# Patient Record
Sex: MALE | Race: ASIAN | NOT HISPANIC OR LATINO | URBAN - METROPOLITAN AREA
[De-identification: names, ages, dates, MRNs, and addresses within clinical notes are randomized per-mention and may not be internally consistent; named-entity substitution may affect disease eponyms.]

---

## 2018-07-08 ENCOUNTER — EMERGENCY (EMERGENCY)
Facility: HOSPITAL | Age: 38
LOS: 1 days | Discharge: HOME | End: 2018-07-08
Attending: EMERGENCY MEDICINE | Admitting: EMERGENCY MEDICINE
Payer: MEDICAID

## 2018-07-08 VITALS
RESPIRATION RATE: 16 BRPM | SYSTOLIC BLOOD PRESSURE: 139 MMHG | TEMPERATURE: 98 F | DIASTOLIC BLOOD PRESSURE: 85 MMHG | HEART RATE: 86 BPM | OXYGEN SATURATION: 100 %

## 2018-07-08 PROCEDURE — 99283 EMERGENCY DEPT VISIT LOW MDM: CPT

## 2018-07-08 RX ORDER — IBUPROFEN 200 MG
1 TABLET ORAL
Qty: 28 | Refills: 0 | OUTPATIENT
Start: 2018-07-08 | End: 2018-07-14

## 2018-07-08 RX ORDER — IBUPROFEN 200 MG
600 TABLET ORAL ONCE
Qty: 0 | Refills: 0 | Status: COMPLETED | OUTPATIENT
Start: 2018-07-08 | End: 2018-07-08

## 2018-07-08 RX ADMIN — Medication 600 MILLIGRAM(S): at 18:33

## 2018-07-08 NOTE — ED PROVIDER NOTE - MEDICAL DECISION MAKING DETAILS
38 y/o M w/ MSK lower back pain x3days. No sign for concerning infection . Pt has not taken an appropriate dose of NSAIDs prior to coming to ED Plan: Limited physical activity, Ibuprofen every 6 hrs as needed for back pain, if symptoms persist or worsen pt will need to be reevaluated, possible MRI, FU spine surgery , return precautions 38 y/o M w/ MSK lower back pain x3days. No signs or symptoms concerning for cauda equina syndrome or infection.  Pt has not taken an appropriate dose of NSAIDs prior to coming to ED Plan: Limited physical activity, Ibuprofen every 6 hrs as needed for back pain, if symptoms persist or worsen pt will need to be reevaluated, possible MRI, FU spine surgery , return precautions

## 2018-07-08 NOTE — ED ADULT TRIAGE NOTE - CHIEF COMPLAINT QUOTE
history via [facility] . c/o lower back pains x  past 2 days. 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 denies abd pains,problems   urinating

## 2018-07-08 NOTE — ED PROVIDER NOTE - NS_ ATTENDINGSCRIBEDETAILS _ED_A_ED_FT
MD Jeff:  The scribe's documentation has been prepared under my direction and personally reviewed by me in its entirety. I confirm that the note above accurately reflects all work, treatment, procedures, and medical decision making performed by me.  MD Jeff:  see the MDM & progress notes for my I&P.

## 2018-07-08 NOTE — ED PROVIDER NOTE - OBJECTIVE STATEMENT
38 y/o M w/ no pertinent PMH presents to ED c/o back pain x3days. Pt was sitting down and when he was suddenly getting up there was a sharp pain in his back. He notes to experiencing similar symptoms in the past but it went away after some exercise. 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 He is currently ambulatory in ER but is unable to stand up straight and notes to pain worsening w/ movement. He feels relief when lying down straight, or staying still. Endorses use of Tylenol for pain relief. Denies any numbness/tingling or any other complaints. NKDA. : [id number] 36 y/o M w/ no pertinent PMH presents to ED c/o back pain x3days. Pt was sitting down and when he was suddenly getting up there was a sharp pain in his back. He notes to experiencing similar symptoms in the past but it went away after some exercise. 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 He is currently ambulatory in ER but is unable to stand up straight.  Worse: movement. Better: feels relief when lying down straight, or staying still. Endorses use of Tylenol w/o significant relief of Sx.  Denies any numbness/tingling, weakness, or saddle anaesthesia.  no f/c.  NKDA. : [id number]

## 2018-07-08 NOTE — ED PROVIDER NOTE - EXTREMITY EXAM
Mild distress, walks hunched over, no midline tenderness,  No saddle anesthesia, reflexes 2+ symmetric, positive R paraspinal ttp.

## 2018-09-29 ENCOUNTER — EMERGENCY (EMERGENCY)
Facility: HOSPITAL | Age: 38
LOS: 1 days | Discharge: HOME | End: 2018-09-29
Attending: EMERGENCY MEDICINE | Admitting: EMERGENCY MEDICINE
Payer: MEDICAID

## 2018-09-29 VITALS
HEART RATE: 55 BPM | DIASTOLIC BLOOD PRESSURE: 64 MMHG | RESPIRATION RATE: 18 BRPM | SYSTOLIC BLOOD PRESSURE: 117 MMHG | OXYGEN SATURATION: 100 %

## 2018-09-29 VITALS
OXYGEN SATURATION: 100 % | TEMPERATURE: 98 F | DIASTOLIC BLOOD PRESSURE: 82 MMHG | SYSTOLIC BLOOD PRESSURE: 129 MMHG | HEART RATE: 72 BPM | RESPIRATION RATE: 16 BRPM

## 2018-09-29 LAB
ALBUMIN SERPL ELPH-MCNC: 4.6 G/DL — SIGNIFICANT CHANGE UP (ref 3.3–5)
ALP SERPL-CCNC: 95 U/L — SIGNIFICANT CHANGE UP (ref 40–120)
ALT FLD-CCNC: 42 U/L — HIGH (ref 4–41)
AST SERPL-CCNC: 27 U/L — SIGNIFICANT CHANGE UP (ref 4–40)
BASOPHILS # BLD AUTO: 0.05 K/UL — SIGNIFICANT CHANGE UP (ref 0–0.2)
BASOPHILS NFR BLD AUTO: 0.4 % — SIGNIFICANT CHANGE UP (ref 0–2)
BILIRUB SERPL-MCNC: 0.7 MG/DL — SIGNIFICANT CHANGE UP (ref 0.2–1.2)
BUN SERPL-MCNC: 10 MG/DL — SIGNIFICANT CHANGE UP (ref 7–23)
CALCIUM SERPL-MCNC: 9.8 MG/DL — SIGNIFICANT CHANGE UP (ref 8.4–10.5)
CHLORIDE SERPL-SCNC: 103 MMOL/L — SIGNIFICANT CHANGE UP (ref 98–107)
CO2 SERPL-SCNC: 24 MMOL/L — SIGNIFICANT CHANGE UP (ref 22–31)
CREAT SERPL-MCNC: 0.99 MG/DL — SIGNIFICANT CHANGE UP (ref 0.5–1.3)
EOSINOPHIL # BLD AUTO: 0.56 K/UL — HIGH (ref 0–0.5)
EOSINOPHIL NFR BLD AUTO: 4.8 % — SIGNIFICANT CHANGE UP (ref 0–6)
GLUCOSE SERPL-MCNC: 104 MG/DL — HIGH (ref 70–99)
HCT VFR BLD CALC: 45.4 % — SIGNIFICANT CHANGE UP (ref 39–50)
HGB BLD-MCNC: 14.6 G/DL — SIGNIFICANT CHANGE UP (ref 13–17)
IMM GRANULOCYTES # BLD AUTO: 0.03 # — SIGNIFICANT CHANGE UP
IMM GRANULOCYTES NFR BLD AUTO: 0.3 % — SIGNIFICANT CHANGE UP (ref 0–1.5)
LYMPHOCYTES # BLD AUTO: 26.1 % — SIGNIFICANT CHANGE UP (ref 13–44)
LYMPHOCYTES # BLD AUTO: 3.07 K/UL — SIGNIFICANT CHANGE UP (ref 1–3.3)
MCHC RBC-ENTMCNC: 28 PG — SIGNIFICANT CHANGE UP (ref 27–34)
MCHC RBC-ENTMCNC: 32.2 % — SIGNIFICANT CHANGE UP (ref 32–36)
MCV RBC AUTO: 87.1 FL — SIGNIFICANT CHANGE UP (ref 80–100)
MONOCYTES # BLD AUTO: 0.61 K/UL — SIGNIFICANT CHANGE UP (ref 0–0.9)
MONOCYTES NFR BLD AUTO: 5.2 % — SIGNIFICANT CHANGE UP (ref 2–14)
NEUTROPHILS # BLD AUTO: 7.43 K/UL — HIGH (ref 1.8–7.4)
NEUTROPHILS NFR BLD AUTO: 63.2 % — SIGNIFICANT CHANGE UP (ref 43–77)
NRBC # FLD: 0 — SIGNIFICANT CHANGE UP
PLATELET # BLD AUTO: 371 K/UL — SIGNIFICANT CHANGE UP (ref 150–400)
PMV BLD: 11 FL — SIGNIFICANT CHANGE UP (ref 7–13)
POTASSIUM SERPL-MCNC: 4.3 MMOL/L — SIGNIFICANT CHANGE UP (ref 3.5–5.3)
POTASSIUM SERPL-SCNC: 4.3 MMOL/L — SIGNIFICANT CHANGE UP (ref 3.5–5.3)
PROT SERPL-MCNC: 7.8 G/DL — SIGNIFICANT CHANGE UP (ref 6–8.3)
RBC # BLD: 5.21 M/UL — SIGNIFICANT CHANGE UP (ref 4.2–5.8)
RBC # FLD: 11.7 % — SIGNIFICANT CHANGE UP (ref 10.3–14.5)
SODIUM SERPL-SCNC: 141 MMOL/L — SIGNIFICANT CHANGE UP (ref 135–145)
TROPONIN T, HIGH SENSITIVITY: < 6 NG/L — SIGNIFICANT CHANGE UP (ref ?–14)
WBC # BLD: 11.75 K/UL — HIGH (ref 3.8–10.5)
WBC # FLD AUTO: 11.75 K/UL — HIGH (ref 3.8–10.5)

## 2018-09-29 PROCEDURE — 99284 EMERGENCY DEPT VISIT MOD MDM: CPT

## 2018-09-29 NOTE — ED PROVIDER NOTE - OBJECTIVE STATEMENT
37 yr old M c h/o HTN who p/w left sided chest pain radiating to left arm intermittently today.  Has been coming and going unrelated to exertion.  Has been able to continue working.  No SOB or fever/chills. No N/V/d  No recent immobilization or travel.  no trauma. No diaphoresis.  Hx obtained via Amharic  and so was somewhat limited (Interp #[id number]). This has never happened before.  Last episode was approx 4 hrs before arrival, pain free now.

## 2018-09-29 NOTE — ED ADULT NURSE NOTE - OBJECTIVE STATEMENT
Patient is a 38 y/o male, Turkmen speaking, presents to ED for left sided chest pain and left arm numbness x 3 hrs.  Denies SOB, dizziness, nausea and vomiting.  No cardiac history.  Patient was elevated by Dr. Schwarz in triage and recommended go to [facility] for eval.  Patient still complaining about numbness in left arm but able to move arm without difficulty and speech is understandable.  20g IV left AC, labs drawn and sent, vitals taken and will continue to monitor patient. Patient is a 38 y/o male, Wolof speaking, presents to ED for left sided chest pain and left arm numbness x 3 hrs.  He was washing his car when he started experiencing the symptoms.  Ambulatory without assistance.  Denies SOB, dizziness, nausea and vomiting.  No cardiac history.  Patient was elevated by Dr. Schwarz in triage and recommended go to [facility] for eval.  Patient still complaining about numbness in left arm but able to move arm without difficulty and speech is understandable.  20g IV left AC, labs drawn and sent, vitals taken and will continue to monitor patient.

## 2018-09-29 NOTE — ED ADULT TRIAGE NOTE - CHIEF COMPLAINT QUOTE
l sided ch pains started today. states larm,l leg " feel funny" symptoms started 3 pm. no slurred speech,  fs 107 l sided ch pains started today. states larm,l leg " feel funny" symptoms started 3 pm. no slurred speech,  fs 107. fit md to lorena /interpretor id [id number]. pt stating l sided ch pains,some weakness l hand at present. denies headache. ekg completed. pmh elevated cholesterol. uti l sided ch pains started today. states larm,l leg " feel funny" symptoms started 3 pm. no slurred speech,  fs 107. fit md to lorena /interpretor id [id number]. pt stating l sided ch pains,some weakness l hand at present. denies headache. ekg completed. pmh elevated cholesterol. uti  Telugu speaking

## 2018-09-29 NOTE — ED ADULT NURSE NOTE - CHIEF COMPLAINT QUOTE
l sided ch pains started today. states larm,l leg " feel funny" symptoms started 3 pm. no slurred speech,  fs 107. fit md to lorena /interpretor id [id number]. pt stating l sided ch pains,some weakness l hand at present. denies headache. ekg completed. pmh elevated cholesterol. uti  Latvian speaking

## 2018-09-29 NOTE — ED PROVIDER NOTE - MEDICAL DECISION MAKING DETAILS
Chest pain, to left arm.  No neuro deficits at present despite initial complaints.  Low risk of MACE by Heart Score pending trop.  Will send labs, ECG, reassess.

## 2018-09-29 NOTE — ED PROVIDER NOTE - PROGRESS NOTE DETAILS
Dr. Carlos: Was called for stroke eval. Utilized [email or web address] ID# [id number]. Pt states 3 hours ago he was washing his car and started to get left sided chest pain that radiated to left hand. Pt notes having some left leg numbness at that time that has since resolved but continued to have left hand numbness. Pt denies any fever, chills, nausea, vomiting, SOB, or abd pain. Pt has a history of HTN, HLD, and Reflux. Lungs CTA b/l. RRR. Abd soft, non-tender. 5/5 b/l UE and LE. No facial asymmetry. EKG shows no acute findings. Advised further evaluation in main. Remained pain free throughot and no neuro deficits.  States he has a PMD who manages his BP and who he will be able to see.

## 2018-09-29 NOTE — ED PROVIDER NOTE - CARDIAC, MLM
Normal rate, regular rhythm.  Heart sounds S1, S2.  No murmurs, rubs or gallops. No repro tenderness.

## 2018-09-29 NOTE — ED ADULT NURSE NOTE - NSIMPLEMENTINTERV_GEN_ALL_ED
Implemented All Universal Safety Interventions:  [place] to call system. Call bell, personal items and telephone within reach. Instruct patient to call for assistance. Room bathroom lighting operational. Non-slip footwear when patient is off stretcher. Physically safe environment: no spills, clutter or unnecessary equipment. Stretcher in lowest position, wheels locked, appropriate side rails in place.

## 2019-06-17 ENCOUNTER — EMERGENCY (EMERGENCY)
Facility: HOSPITAL | Age: 39
LOS: 1 days | Discharge: HOME | End: 2019-06-17
Attending: EMERGENCY MEDICINE | Admitting: EMERGENCY MEDICINE
Payer: MEDICAID

## 2019-06-17 VITALS
TEMPERATURE: 99 F | RESPIRATION RATE: 18 BRPM | HEART RATE: 97 BPM | DIASTOLIC BLOOD PRESSURE: 72 MMHG | SYSTOLIC BLOOD PRESSURE: 132 MMHG | OXYGEN SATURATION: 99 %

## 2019-06-17 PROBLEM — I10 ESSENTIAL (PRIMARY) HYPERTENSION: Chronic | Status: ACTIVE | Noted: 2018-09-30

## 2019-06-17 PROCEDURE — 10160 PNXR ASPIR ABSC HMTMA BULLA: CPT | Mod: RT

## 2019-06-17 PROCEDURE — 99283 EMERGENCY DEPT VISIT LOW MDM: CPT | Mod: 25

## 2019-06-17 RX ORDER — AZTREONAM 2 G
1 VIAL (EA) INJECTION
Qty: 20 | Refills: 0
Start: 2019-06-17 | End: 2019-06-26

## 2019-06-17 NOTE — ED PROVIDER NOTE - ATTENDING CONTRIBUTION TO CARE
38M presents with a facial abscess. REports increasing swelling and pain in front of R ear over charlee past week. No fever. No drainage. No jaw pain. On exam well appearing, nad, ½ cm area of swelling, redness and fluctuance in front of R ear, no drainage, breathing comfortably, alert, speech clear. Drained via needle aspiration. Plan for abx and d/c home.

## 2019-06-17 NOTE — ED ADULT TRIAGE NOTE - CHIEF COMPLAINT QUOTE
Patient reports abscess on right side of face next to ear for 7-10 days. Patient is afebrile, well-appearing.

## 2019-06-17 NOTE — ED PROVIDER NOTE - NS ED ROS FT
GENERAL: no fever, chills  HEENT: no cough, congestion, odynophagia, dysphagia  CARDIAC: no chest pain, palpitations, lightheadedness  PULM: no dyspnea, wheezing   GI: no abdominal pain, nausea, vomiting, diarrhea, constipation, melena, hematochezia  : no urinary dysuria, frequency, incontinence, hematuria  NEURO: no headache, motor weakness, sensory changes  MSK: no joint or muscle pain  SKIN: right facial abscess  HEME: no active bleeding, bruising

## 2019-06-17 NOTE — ED PROVIDER NOTE - PHYSICAL EXAMINATION
GENERAL: no acute distress, non-toxic appearing  HEAD: normocephalic, atraumatic  HEENT: normal conjunctiva, oral mucosa moist, neck supple  CARDIAC: regular rate and rhythm, normal S1 and S2,  no appreciable murmurs  PULM: clear to ascultation bilaterally, no crackles, rales, rhonchi, or wheezing  GI: abdomen nondistended, soft, nontender, no guarding or rebound tenderness  NEURO: alert and oriented x 3, normal speech, PERRLA, EOMI, no focal motor or sensory deficits, nonantalgic gait  MSK: no visible deformities, no peripheral edema, calf tenderness/redness/swelling  SKIN: 1 cm x 1 cm fluctuant abscess anterior to right ear  PSYCH: appropriate mood and affect

## 2019-06-17 NOTE — ED PROVIDER NOTE - NSFOLLOWUPINSTRUCTIONS_ED_ALL_ED_FT
Your diagnosis: facial abscess. You had a needle aspiration performed in the ED today.    Discharge instructions:    - Please follow up with your Primary Care Doctor.    - Tylenol up to 650 mg every 8 hours as needed for pain and/or Motrin up to 600 mg every 6 hours as needed for pain. Take any prescribed medications as instructed: BACTRIM 800-160 TWICE A DAY for 10 DAYS.    - Others:    - Be sure to return to the ED if you develop new or worsening symptoms. Specific signs and symptoms to be vigilant of: fever or chills, chest pain, difficulty breathing, palpitations, loss of consciousness, headache, vision changes, slurred speech, difficulty swallowing or drooling, facial droop, weakness in the arms or legs, numbness or tingling, abdominal pain, nausea or vomiting, diarrhea, constipation, blood in the stool or urine, pain on urination, difficulty urinating.

## 2020-02-27 NOTE — ED PROVIDER NOTE - PSYCHIATRIC, MLM
Alert and oriented to person, place, time/situation. normal mood and affect. no apparent risk to self or others.
no

## 2020-10-20 NOTE — ED PROVIDER NOTE - CPE EDP CARDIAC NORM
Impression: S/P CE/Standard IOL LI61SE 20.00 OD - 1 Day. Presence of intraocular lens  Z96.1.  Plan: --Continue all meds normal...

## 2022-11-21 NOTE — ED PROVIDER NOTE - TOBACCO USE
Never smoker Post-Care Instructions: Should the patient develop any fevers, chills, bleeding, severe pain patient will contact the office immediately.

## 2025-06-03 NOTE — ED ADULT TRIAGE NOTE - SPO2 (%)
100 Bed in lowest position, wheels locked, appropriate side rails in place/Call bell, personal items and telephone in reach/Instruct patient to call for assistance before getting out of bed or chair/Non-slip footwear when patient is out of bed/[place] to call system/Physically safe environment - no spills, clutter or unnecessary equipment/Purposeful Proactive Rounding/Room/bathroom lighting operational, light cord in reach